# Patient Record
Sex: MALE | Race: WHITE | Employment: FULL TIME | URBAN - METROPOLITAN AREA
[De-identification: names, ages, dates, MRNs, and addresses within clinical notes are randomized per-mention and may not be internally consistent; named-entity substitution may affect disease eponyms.]

---

## 2024-06-17 ENCOUNTER — OFFICE VISIT (OUTPATIENT)
Dept: ORTHOPEDIC SURGERY | Age: 54
End: 2024-06-17
Payer: COMMERCIAL

## 2024-06-17 VITALS — BODY MASS INDEX: 28.63 KG/M2 | WEIGHT: 200 LBS | HEIGHT: 70 IN | RESPIRATION RATE: 16 BRPM

## 2024-06-17 DIAGNOSIS — S52.502A CLOSED FRACTURE OF DISTAL END OF LEFT RADIUS, UNSPECIFIED FRACTURE MORPHOLOGY, INITIAL ENCOUNTER: Primary | ICD-10-CM

## 2024-06-17 PROCEDURE — G8419 CALC BMI OUT NRM PARAM NOF/U: HCPCS

## 2024-06-17 PROCEDURE — 36415 COLL VENOUS BLD VENIPUNCTURE: CPT

## 2024-06-17 PROCEDURE — G8428 CUR MEDS NOT DOCUMENT: HCPCS

## 2024-06-17 PROCEDURE — 99203 OFFICE O/P NEW LOW 30 MIN: CPT

## 2024-06-17 PROCEDURE — 3017F COLORECTAL CA SCREEN DOC REV: CPT

## 2024-06-17 PROCEDURE — 4004F PT TOBACCO SCREEN RCVD TLK: CPT

## 2024-06-17 NOTE — PROGRESS NOTES
ORTHOPEDIC NEW PATIENT EVALUATION      HPI / Chief Complaint  Justin Kyle is a 53 y.o. male who presents for evaluation of a left wrist fracture.  Patient states he was working on his boat last Thursday and fell but is unsure how exactly he landed on his wrist.  States he noted obvious deformity and instant swelling over the dorsal aspect of his wrist.  States that he has not taken anything for the pain at this point other than some aspirin.  He was placed in a sugar-tong splint to the left upper extremity at his visit to the ED on that same day as the incident.  Patient states that he is having some pain at the wrist although it is much improved at this point.  He does state some pain in the elbow due to the splint and states it has been difficult for him to sleep as it is difficult to find a comfortable position.  Denies any previous surgeries.  Denies any blood thinners.  States he has no numbness or tingling into the hand or fingers.    Past Medical History  Justin  has no past medical history on file.    Past Surgical History  Justin  has no past surgical history on file.    Current Medications  No current outpatient medications on file.     No current facility-administered medications for this visit.       Allergies  Allergies have been reviewed.  Justin has No Known Allergies.    Social History  Justin      Family History  Justin's family history is not on file.      Review of Systems   History obtained from the patient.   REVIEW OF SYSTEMS:   Constitution: negative for fever, chills, weight loss or malaise   Musculoskeletal: As noted in the HPI   Neurologic: As noted in the HPI    Physical Exam  Resp 16   Ht 1.778 m (5' 10\")   Wt 90.7 kg (200 lb)   BMI 28.70 kg/m²    General Appearance: alert, well appearing, and in no distress  Mental Status: alert, oriented to person, place, and time  Evaluation of the patient's left wrist and upper extremity demonstrate the patient to be in a well-fitted

## 2024-06-18 ENCOUNTER — HOSPITAL ENCOUNTER (OUTPATIENT)
Dept: PREADMISSION TESTING | Age: 54
Setting detail: OUTPATIENT SURGERY
Discharge: HOME | End: 2024-06-22
Payer: COMMERCIAL

## 2024-06-18 ENCOUNTER — TELEPHONE (OUTPATIENT)
Dept: ORTHOPEDIC SURGERY | Age: 54
End: 2024-06-18

## 2024-06-18 VITALS
OXYGEN SATURATION: 100 % | SYSTOLIC BLOOD PRESSURE: 164 MMHG | RESPIRATION RATE: 16 BRPM | TEMPERATURE: 98.2 F | BODY MASS INDEX: 28.63 KG/M2 | DIASTOLIC BLOOD PRESSURE: 100 MMHG | WEIGHT: 200 LBS | HEART RATE: 71 BPM | HEIGHT: 70 IN

## 2024-06-18 DIAGNOSIS — S52.502A CLOSED FRACTURE OF DISTAL END OF LEFT RADIUS, UNSPECIFIED FRACTURE MORPHOLOGY, INITIAL ENCOUNTER: ICD-10-CM

## 2024-06-18 LAB
ANION GAP SERPL CALCULATED.3IONS-SCNC: 9 MMOL/L (ref 9–17)
BUN SERPL-MCNC: 17 MG/DL (ref 6–20)
CALCIUM SERPL-MCNC: 9.3 MG/DL (ref 8.6–10.4)
CHLORIDE SERPL-SCNC: 104 MMOL/L (ref 98–107)
CO2 SERPL-SCNC: 28 MMOL/L (ref 20–31)
CREAT SERPL-MCNC: 1.1 MG/DL (ref 0.7–1.2)
ERYTHROCYTE [DISTWIDTH] IN BLOOD BY AUTOMATED COUNT: 13.2 % (ref 11.5–14.9)
GFR, ESTIMATED: 80 ML/MIN/1.73M2
GLUCOSE SERPL-MCNC: 107 MG/DL (ref 70–99)
HCT VFR BLD AUTO: 44.1 % (ref 41–53)
HGB BLD-MCNC: 15.3 G/DL (ref 13.5–17.5)
MCH RBC QN AUTO: 31.2 PG (ref 26–34)
MCHC RBC AUTO-ENTMCNC: 34.6 G/DL (ref 31–37)
MCV RBC AUTO: 90 FL (ref 80–100)
PLATELET # BLD AUTO: 190 K/UL (ref 150–450)
PMV BLD AUTO: 7.8 FL (ref 6–12)
POTASSIUM SERPL-SCNC: 4.7 MMOL/L (ref 3.7–5.3)
RBC # BLD AUTO: 4.9 M/UL (ref 4.5–5.9)
SODIUM SERPL-SCNC: 141 MMOL/L (ref 135–144)
WBC OTHER # BLD: 5.8 K/UL (ref 3.5–11)

## 2024-06-18 PROCEDURE — 85027 COMPLETE CBC AUTOMATED: CPT

## 2024-06-18 PROCEDURE — APPSS45 APP SPLIT SHARED TIME 31-45 MINUTES: Performed by: NURSE PRACTITIONER

## 2024-06-18 PROCEDURE — 93005 ELECTROCARDIOGRAM TRACING: CPT | Performed by: ANESTHESIOLOGY

## 2024-06-18 PROCEDURE — 36415 COLL VENOUS BLD VENIPUNCTURE: CPT

## 2024-06-18 PROCEDURE — 80048 BASIC METABOLIC PNL TOTAL CA: CPT

## 2024-06-18 RX ORDER — LISINOPRIL 10 MG/1
10 TABLET ORAL DAILY
COMMUNITY
Start: 2023-09-25 | End: 2024-06-18

## 2024-06-18 ASSESSMENT — ENCOUNTER SYMPTOMS
GASTROINTESTINAL NEGATIVE: 1
SORE THROAT: 0
BACK PAIN: 1
COUGH: 0
TROUBLE SWALLOWING: 0
SHORTNESS OF BREATH: 0
ABDOMINAL PAIN: 0

## 2024-06-18 ASSESSMENT — PAIN DESCRIPTION - LOCATION: LOCATION: ARM

## 2024-06-18 ASSESSMENT — PAIN DESCRIPTION - PAIN TYPE: TYPE: ACUTE PAIN

## 2024-06-18 ASSESSMENT — PAIN SCALES - GENERAL: PAINLEVEL_OUTOF10: 4

## 2024-06-18 ASSESSMENT — PAIN DESCRIPTION - DESCRIPTORS: DESCRIPTORS: ACHING

## 2024-06-18 ASSESSMENT — PAIN DESCRIPTION - ORIENTATION: ORIENTATION: LEFT

## 2024-06-18 NOTE — TELEPHONE ENCOUNTER
Attempted to call preferred phone number but no answer and voicemail full.  Called secondary number and was able to speak with the patient.  ASA is requesting medical clearance for his surgery 6/20 due to his high blood pressure.    We referred him to Dr Sadler's office but they were unable to give him an appointment in time.  I gave the patient the phone number for the [facility] and asked him to call to try and make an appointment.    I informed him that if he is unable to get medical clearance ASA will not allow us to proceed with surgery.  He was going to try and call then let us know.

## 2024-06-18 NOTE — DISCHARGE INSTRUCTIONS
prepared for surgery.  A physical assessment will be performed by a nurse practitioner or house officer.  Your IV will be started and you will meet your anesthesiologist.    When you go to surgery, your family will be directed to the surgical waiting room, where the doctor should speak with them after your surgery.    After surgery, you will be taken to the recovery area.  When you are alert and stable, you will receive instructions and be prepared for discharge.     If you use a Bi-PAP or C-PAP machine, please bring it with you and leave it in the car in case it is needed in recovery room.

## 2024-06-18 NOTE — H&P
HISTORY and PHYSICAL  [facility]       NAME:  Justin Kyle  MRN: [id number]   YOB: 1970   Date: 6/18/2024   Age: 53 y.o.  Gender: male     COMPLAINT AND PRESENT HISTORY:   Justin Kyle is 53 y.o.,  male, presents for pre-anesthesia/admission testing for WRIST OPEN REDUCTION INTERNAL FIXATION LEFT per Dr. Yañez.  Primary dx: Closed fracture of distal end of left radius, unspecified fracture morphology, initial encounter [S52.502A].    Office note per Rosendo Sierra PA-C on 6/17/2024  HPI / Chief Complaint  Justin Kyle is a 53 y.o. male who presents for evaluation of a left wrist fracture.  Patient states he was working on his boat last Thursday and fell but is unsure how exactly he landed on his wrist.  States he noted obvious deformity and instant swelling over the dorsal aspect of his wrist.  States that he has not taken anything for the pain at this point other than some aspirin.  He was placed in a sugar-tong splint to the left upper extremity at his visit to the ED on that same day as the incident.  Patient states that he is having some pain at the wrist although it is much improved at this point.  He does state some pain in the elbow due to the splint and states it has been difficult for him to sleep as it is difficult to find a comfortable position.  Denies any previous surgeries.  Denies any blood thinners.  States he has no numbness or tingling into the hand or fingers.  Imaging Studies  3 view xrays of the left wrist obtained on 6/13/2024 were independently reviewed demonstrating the patient to have an intra-articular, displaced, dorsally angulated distal radius fracture.      UPDATE 6/18/2024:  Patient complaints of left wrist pain.  The pain began 6/13/2024 s/p fall with ER visit with closed fracture of left wrist. The pain is located left wrist.  He describes the symptoms as sharp. Symptoms improve with rest. The symptoms are worse with movement and use of injured

## 2024-06-19 ENCOUNTER — TELEPHONE (OUTPATIENT)
Dept: ORTHOPEDIC SURGERY | Age: 54
End: 2024-06-19

## 2024-06-19 RX ORDER — LIDOCAINE HYDROCHLORIDE 10 MG/ML
1 INJECTION, SOLUTION EPIDURAL; INFILTRATION; INTRACAUDAL; PERINEURAL
Status: CANCELLED | OUTPATIENT
Start: 2024-06-20 | End: 2024-06-21

## 2024-06-19 RX ORDER — GABAPENTIN 300 MG/1
300 CAPSULE ORAL ONCE
Status: CANCELLED | OUTPATIENT
Start: 2024-06-20 | End: 2024-06-19

## 2024-06-19 RX ORDER — SODIUM CHLORIDE 0.9 % (FLUSH) 0.9 %
5-40 SYRINGE (ML) INJECTION PRN
Status: CANCELLED | OUTPATIENT
Start: 2024-06-20

## 2024-06-19 RX ORDER — DEXAMETHASONE SODIUM PHOSPHATE 4 MG/ML
4 INJECTION, SOLUTION INTRA-ARTICULAR; INTRALESIONAL; INTRAMUSCULAR; INTRAVENOUS; SOFT TISSUE ONCE
Status: CANCELLED | OUTPATIENT
Start: 2024-06-20

## 2024-06-19 RX ORDER — MIDAZOLAM HYDROCHLORIDE 1 MG/ML
2 INJECTION INTRAMUSCULAR; INTRAVENOUS
Status: CANCELLED | OUTPATIENT
Start: 2024-06-20 | End: 2024-06-21

## 2024-06-19 RX ORDER — ROPIVACAINE HYDROCHLORIDE 5 MG/ML
20 INJECTION, SOLUTION EPIDURAL; INFILTRATION; PERINEURAL ONCE
Status: CANCELLED | OUTPATIENT
Start: 2024-06-20 | End: 2024-06-19

## 2024-06-19 RX ORDER — SODIUM CHLORIDE 0.9 % (FLUSH) 0.9 %
5-40 SYRINGE (ML) INJECTION EVERY 12 HOURS SCHEDULED
Status: CANCELLED | OUTPATIENT
Start: 2024-06-20

## 2024-06-19 RX ORDER — ACETAMINOPHEN 500 MG
1000 TABLET ORAL ONCE
Status: CANCELLED | OUTPATIENT
Start: 2024-06-20 | End: 2024-06-19

## 2024-06-19 RX ORDER — SODIUM CHLORIDE 9 MG/ML
INJECTION, SOLUTION INTRAVENOUS PRN
Status: CANCELLED | OUTPATIENT
Start: 2024-06-20

## 2024-06-19 RX ORDER — SODIUM CHLORIDE, SODIUM LACTATE, POTASSIUM CHLORIDE, CALCIUM CHLORIDE 600; 310; 30; 20 MG/100ML; MG/100ML; MG/100ML; MG/100ML
INJECTION, SOLUTION INTRAVENOUS CONTINUOUS
Status: CANCELLED | OUTPATIENT
Start: 2024-06-20

## 2024-06-19 NOTE — TELEPHONE ENCOUNTER
Patient called to let me know that he had been unable to get in with a PCP for surgery.  I spoke with Dr Yañez to find out how he wishes to proceed at this point.  Dr Yañez stated we could switch to a closed reduction under local ASA instead if the patient wishes to proceed that way.  If not he could attempt to get in with a PCP and see one of the trauma orthos next week for possible surgery.    I called patient's phone and did not get an answer, voicemail was full.  I called patient's son's number and asked him to have the patient call me back.

## 2024-06-19 NOTE — PRE-PROCEDURE INSTRUCTIONS
Nothing to eat after midnight.   Are you taking any blood thinners? When was the last day?  Make sure to use Hibiclens prior to surgery.  Remove any jewelry and body piercings.  Do you wear glasses? If so, please bring a case to store them in.  Are you having any Covid symptoms?  Do you have any new rashes, infections, etc. that we should be aware of?  Do you have a ride home the day of surgery? It cannot be a cab or medical transportation.  Verify surgery time and what time to arrive at hospital.  UNABLE TO LEAVE VM

## 2024-06-20 ENCOUNTER — HOSPITAL ENCOUNTER (OUTPATIENT)
Age: 54
Setting detail: OUTPATIENT SURGERY
Discharge: HOME | End: 2024-06-20
Attending: ORTHOPAEDIC SURGERY | Admitting: ORTHOPAEDIC SURGERY
Payer: COMMERCIAL

## 2024-06-20 VITALS
HEIGHT: 70 IN | WEIGHT: 200 LBS | OXYGEN SATURATION: 100 % | HEART RATE: 74 BPM | TEMPERATURE: 97.4 F | SYSTOLIC BLOOD PRESSURE: 168 MMHG | RESPIRATION RATE: 16 BRPM | BODY MASS INDEX: 28.63 KG/M2 | DIASTOLIC BLOOD PRESSURE: 100 MMHG

## 2024-06-20 PROCEDURE — 2709999900 HC NON-CHARGEABLE SUPPLY: Performed by: ORTHOPAEDIC SURGERY

## 2024-06-20 PROCEDURE — 3600000012 HC SURGERY LEVEL 2 ADDTL 15MIN: Performed by: ORTHOPAEDIC SURGERY

## 2024-06-20 PROCEDURE — 7100000010 HC PHASE II RECOVERY - FIRST 15 MIN: Performed by: ORTHOPAEDIC SURGERY

## 2024-06-20 PROCEDURE — 25605 CLTX DST RDL FX/EPHYS SEP W/: CPT | Performed by: ORTHOPAEDIC SURGERY

## 2024-06-20 PROCEDURE — 6360000002 HC RX W HCPCS: Performed by: ORTHOPAEDIC SURGERY

## 2024-06-20 PROCEDURE — 7100000011 HC PHASE II RECOVERY - ADDTL 15 MIN: Performed by: ORTHOPAEDIC SURGERY

## 2024-06-20 PROCEDURE — 3600000002 HC SURGERY LEVEL 2 BASE: Performed by: ORTHOPAEDIC SURGERY

## 2024-06-20 RX ORDER — ACETAMINOPHEN 325 MG/1
1000 TABLET ORAL ONCE
Status: CANCELLED | OUTPATIENT
Start: 2024-06-20 | End: 2024-06-20

## 2024-06-20 RX ORDER — SODIUM CHLORIDE 9 MG/ML
INJECTION, SOLUTION INTRAVENOUS PRN
Status: CANCELLED | OUTPATIENT
Start: 2024-06-20

## 2024-06-20 RX ORDER — SODIUM CHLORIDE 0.9 % (FLUSH) 0.9 %
5-40 SYRINGE (ML) INJECTION PRN
Status: CANCELLED | OUTPATIENT
Start: 2024-06-20

## 2024-06-20 RX ORDER — BUPIVACAINE HYDROCHLORIDE 5 MG/ML
INJECTION, SOLUTION EPIDURAL; INTRACAUDAL PRN
Status: DISCONTINUED | OUTPATIENT
Start: 2024-06-20 | End: 2024-06-20 | Stop reason: ALTCHOICE

## 2024-06-20 RX ORDER — DEXAMETHASONE SODIUM PHOSPHATE 10 MG/ML
10 INJECTION, SOLUTION INTRAMUSCULAR; INTRAVENOUS ONCE
Status: CANCELLED | OUTPATIENT
Start: 2024-06-20 | End: 2024-06-20

## 2024-06-20 RX ORDER — SODIUM CHLORIDE 0.9 % (FLUSH) 0.9 %
5-40 SYRINGE (ML) INJECTION EVERY 12 HOURS SCHEDULED
Status: CANCELLED | OUTPATIENT
Start: 2024-06-20

## 2024-06-20 ASSESSMENT — PAIN - FUNCTIONAL ASSESSMENT
PAIN_FUNCTIONAL_ASSESSMENT: NONE - DENIES PAIN
PAIN_FUNCTIONAL_ASSESSMENT: NONE - DENIES PAIN

## 2024-06-20 NOTE — H&P
Update History & Physical    The patient's History and Physical of June 18, 2024 was reviewed with the patient and I examined the patient. There was no change. The surgical site was confirmed by the patient and me. PT UNDERGOING FOR WRIST OPEN REDUCTION INTERNAL FIXATION LEFT per Dr. Yañez.   Pt denies fever/chills, chest pain or SOB  Pt  Npo since the past midnight, no am medication today  H/o of MRSA infection left knee 10-15 years ago  Denies hx of blood clots.  Anticoagulation: None  Denies hx of any personal or family hx of complications w/anesthesia.   Pt state he has no  medical clearance   He stopped taking his BP for almost one year due to no refill of his BP medication.  Last cardiology office visit  9/25/2023.  Physical exam reman unchanged including cardiac and pulmonary assessment   See nursing flow sheet for vital sings     Lab Results   Component Value Date    WBC 5.8 06/18/2024    HGB 15.3 06/18/2024    HCT 44.1 06/18/2024    MCV 90.0 06/18/2024     06/18/2024     Lab Results   Component Value Date/Time     06/18/2024 11:02 AM    K 4.7 06/18/2024 11:02 AM     06/18/2024 11:02 AM    CO2 28 06/18/2024 11:02 AM    BUN 17 06/18/2024 11:02 AM    CREATININE 1.1 06/18/2024 11:02 AM    GLUCOSE 107 06/18/2024 11:02 AM    CALCIUM 9.3 06/18/2024 11:02 AM    LABGLOM 80 06/18/2024 11:02 AM      BP Readings from Last 3 Encounters:   06/18/24 (!) 164/100     Pulse Readings from Last 3 Encounters:   06/18/24 71     ECG 6/18/2024  Sinus rhythm with marked sinus arrhythmia  Minimal voltage criteria for LVH, may be normal variant ( Sokolow-Santos )  Borderline ECG  No previous ECGs available      Electronically signed by KENTON Lauren CNP on 6/20/2024 at 9:19 AM            HISTORY and PHYSICAL  [facility]         NAME:  Justin Kyle  MRN: [id number]   YOB: 1970   Date: 6/18/2024   Age: 53 y.o.  Gender: male      COMPLAINT AND PRESENT HISTORY:   Justin Kyle is 53  normal variant  Borderline ECG  SURGERY / PROVISIONAL DIAGNOSES:       WRIST OPEN REDUCTION INTERNAL FIXATION LEFT     Closed fracture of distal end of left radius, unspecified fracture morphology, initial encounter [S54.383M]     There are no problems to display for this patient.           CLEARANCE: Dr. Gonsalez, anesthesia, was contacted and informed of patient's history and planned surgery.  Medical clearance required for scheduled surgery d/t the following issues which are discussed in H&P above: uncontrolled HTN     Dr. Yañez's office will be responsible for making sure the clearance is obtained and is in the chart for surgery.      Total time spent on encounter- PAT provider minutes: 21-30 minutes      KENTON Akers CNP on 6/18/2024 at 10:51 AM

## 2024-06-20 NOTE — BRIEF OP NOTE
Brief Postoperative Note      Patient: Justin Kyle  YOB: 1970  MRN: [id number]    Date of Procedure: 6/20/2024    Pre-Op Diagnosis Codes:     * Closed fracture of distal end of left radius, unspecified fracture morphology, initial encounter [S52.502A]    Post-Op Diagnosis: Same       Procedure(s):  WRIST CLOSED REDUCTION    Surgeon(s):  Tanja Yañez MD    Assistant:  Physician Assistant: Rosendo Sierra PA-C    Anesthesia: Local    Estimated Blood Loss (mL): None    Complications: None    Specimens:   * No specimens in log *    Implants:  * No implants in log *      Drains: * No LDAs found *    Findings:  Infection Present At Time Of Surgery (PATOS) (choose all levels that have infection present):  No infection present  Other Findings: See operative report    Electronically signed by Tanja Yañez MD on 6/20/2024 at 1:35 PM

## 2024-06-20 NOTE — DISCHARGE INSTRUCTIONS
Tanja Yañez MD  [facility]  Specializing in Shoulder and Elbow Surgery      POSTPROCEDURE INSTRUCTIONS    Procedure: Closed Reduction and Immobilization of Left Distal Radius Fracture    DIET  Begin with clear liquids and light foods (jellos, soups, etc.).  Progress to your normal diet if you are not nauseated.    WOUND CARE  Maintain your splint, may loosen bandage if swelling occurs.  Your splint will be changed at your 2 week follow up clinic visit.   Keep your splint on, clean, and dry at all times - you may shower by  placing a large garbage bag over the extremity - do not immerse your arm (i.e. in a bath, pool).    MEDICATIONS  Most patients will require some narcotic pain medication for a short period of time - start it before numbing medicine wears off if used, and use as directed on the bottle.  Common side effects of the pain medication are nausea, drowsiness, and constipation - to decrease the side effects, take medication with food - if constipation occurs, consider taking an over-the-counter laxative.  If you are having problems with nausea and vomiting, take your anti-emetic if prescribed, otherwise, contact the office to possibly have your medication changed ([phone]).  Do not drive or operate machinery while taking the narcotic medication.  Tylenol may be taken to help with pain control. Do not take this if you are taking your prescription pain medication, as this already contains tylenol.  Please resume all home medications, unless instructed otherwise.    ACTIVITY  Keep the limb elevated for several days following surgery to decrease swelling.  Do not engage in activities which increase pain/swelling such as lifting, pushing or pulling for at least 6 weeks following surgery.  NO driving while taking narcotic medications or until instructed otherwise by physician.  Do not weight-bear through the left arm;  you

## 2024-06-21 LAB
EKG ATRIAL RATE: 64 BPM
EKG P AXIS: 47 DEGREES
EKG P-R INTERVAL: 148 MS
EKG Q-T INTERVAL: 404 MS
EKG QRS DURATION: 90 MS
EKG QTC CALCULATION (BAZETT): 416 MS
EKG R AXIS: 34 DEGREES
EKG T AXIS: 8 DEGREES
EKG VENTRICULAR RATE: 64 BPM

## 2024-06-21 NOTE — OP NOTE
Procedure Report     Date of Procedure: 6/20/2024      Pre-procedure Diagnosis: Closed left intra-articular distal radius fracture     Post-procedure Diagnosis: Closed left intra-articular distal radius fracture      Procedure: Closed reduction and immobilization of left distal radius fracture (25570)     Surgeon(s): Tanja Yañez MD     Anesthesia: No resident present. Rosendo Sierra PA-C, medically necessary for patient positioning, maintenance of reduction as well as splinting.     Estimated blood loss: None     Findings: Displaced intra-articular distal radius fracture with an impacted ulnar fracture fragment     Procedure Indications: Mr. Kyle is a 53 y.o. old male who presented with a closed left intra-articular distal radius fracture. Following a discussion with the patient regarding both non-operative and operative treatment options, he consented to proceed with closed reduction and immobilization of his left distal radius fracture. he came to this decision after demonstrating an understanding of our discussion regarding details of the procedure, risks and benefits, expected outcome, and postoperative course.     Procedure technique:      Following appropriate identification of the patient and his affected extremity, consent was reviewed with the patient and his affected extremity was signed. The patient's wrist was prepped using chlorhexidine. A time out was performed during which the correct patient, operative extremity, and procedure were verified. The dorsum of the patient's left wrist was prepped using chlorhexidine and a hematoma block was then administered using 8 cc of 0.5% Marcaine without epinephrine. I then proceeded to gently manipulate the patient's left distal radius under fluoroscopic guidance reducing the distal radius to acceptable alignment.  The impacted ulnar fragment showed some improvement in alignment but remained impacted.  A well-padded sugar tong splint was then applied to the

## 2024-06-28 PROBLEM — S52.572A OTHER INTRAARTICULAR FRACTURE OF LOWER END OF LEFT RADIUS, INITIAL ENCOUNTER FOR CLOSED FRACTURE: Status: ACTIVE | Noted: 2024-06-28

## 2024-07-08 ENCOUNTER — OFFICE VISIT (OUTPATIENT)
Dept: ORTHOPEDIC SURGERY | Age: 54
End: 2024-07-08

## 2024-07-08 VITALS — WEIGHT: 199.96 LBS | BODY MASS INDEX: 28.63 KG/M2 | RESPIRATION RATE: 16 BRPM | HEIGHT: 70 IN

## 2024-07-08 DIAGNOSIS — S52.502A CLOSED FRACTURE OF DISTAL END OF LEFT RADIUS, UNSPECIFIED FRACTURE MORPHOLOGY, INITIAL ENCOUNTER: Primary | ICD-10-CM

## 2024-07-08 PROCEDURE — 99024 POSTOP FOLLOW-UP VISIT: CPT | Performed by: ORTHOPAEDIC SURGERY

## 2024-07-08 NOTE — PROGRESS NOTES
Procedure: Closed reduction left distal radius fracture  Date of procedure: 6/20/2024    HPI: Mr. Kyle is a 53-year-old gentleman just over 2 weeks status post the aforementioned procedure.  Indicates that he is doing fairly well.  Pain has been appropriately controlled.  He denies having any numbness or tingling.    Physical examination:  Evaluation of the patient's left wrist and upper extremity demonstrates intact skin with mild diffuse swelling.  Sensation is grossly intact light touch in all dermatomes and he has a 2+ radial pulse with brisk cap refill in his fingers.    Imaging studies:  3 x-ray views of the left wrist completed on 7/8/2024 reviewed independently demonstrating a distal radius intra-articular fracture with impaction the ulnar segment.  No obvious dislocation or subluxation.     Impression and plan: Mr. Kyle is a 53-year-old gentleman approximately 2 weeks status post closed reduction left distal radius fracture.  He is doing relatively well.  He has a persistent impacted ulnar segment of this fracture.  That was noted to be the case during the manipulation procedure.  I did explain to the patient at that time that unfortunately I would not be able to address this by closed means.  Due to blood pressure problems it was recommended that he be cleared medically and have this addressed as part of that prior to surgery but he declined going this route instead elected to proceed with close reduction.  In any event today his splint was discontinued and he was placed in a well-padded fiberglass short arm cast.  He is instructed to avoid getting this wet.  I would like to see him back for reevaluation in 4 weeks but he may return or call at anytime with any questions and or concerns.

## 2024-07-29 ENCOUNTER — OFFICE VISIT (OUTPATIENT)
Dept: ORTHOPEDIC SURGERY | Age: 54
End: 2024-07-29
Payer: COMMERCIAL

## 2024-07-29 VITALS — HEIGHT: 70 IN | WEIGHT: 199 LBS | BODY MASS INDEX: 28.49 KG/M2 | RESPIRATION RATE: 14 BRPM

## 2024-07-29 DIAGNOSIS — S52.502A CLOSED FRACTURE OF DISTAL END OF LEFT RADIUS, UNSPECIFIED FRACTURE MORPHOLOGY, INITIAL ENCOUNTER: Primary | ICD-10-CM

## 2024-07-29 PROCEDURE — 99024 POSTOP FOLLOW-UP VISIT: CPT

## 2024-07-29 PROCEDURE — 3017F COLORECTAL CA SCREEN DOC REV: CPT

## 2024-07-29 PROCEDURE — 1036F TOBACCO NON-USER: CPT

## 2024-07-29 PROCEDURE — G8419 CALC BMI OUT NRM PARAM NOF/U: HCPCS

## 2024-07-29 PROCEDURE — G8428 CUR MEDS NOT DOCUMENT: HCPCS

## 2024-07-29 NOTE — PROGRESS NOTES
Procedure: Closed reduction left distal radius fracture  Date of procedure: 6/20/2024    HPI: Mr. Kyle is a 53-year-old gentleman just over 6 weeks status post the aforementioned procedure.  Indicates that he is doing fairly well.  States he has been using the arm even though he knows he should not be when he was wearing the cast.  He states he has been doing his normal activities and pain has been appropriately controlled.  States that his symptoms have not changed but he does still have pain over the dorsal aspect of the wrist and states that it is painful when trying to move it.  He continues to deny having any numbness or tingling.  He has been compliant with the cast for the last 3 weeks.    Physical examination:  Evaluation of the patient's left wrist and upper extremity demonstrates intact skin with mild diffuse swelling present.  Sensation is grossly intact light touch in all dermatomes and he has a 2+ radial pulse with brisk cap refill in his fingers.  Able to make a fist.  Able to flex, extend, adduct, abduct all fingers.    Imaging studies:  3 x-ray views of the left wrist completed on 7/8/2024 reviewed independently demonstrating a distal radius intra-articular fracture with impaction the ulnar segment.  No significant dorsal angulation.  No significant change in alignment from previous radiographs.  Interval healing is noted throughout the level of the fracture.  This can no obvious dislocation or subluxation.     Impression and plan: Mr. Kyle is a 53-year-old gentleman approximately 6 weeks status post closed reduction left distal radius fracture.  He is doing relatively well.  He has a persistent impacted ulnar segment of this fracture.  I did discuss with him that it does appear that the alignment of the fracture has not changed significantly from previous radiographs he does have some impaction still present but it does appear the fracture is starting to heal.  We did again discuss the etiologies

## 2024-08-12 ENCOUNTER — HOSPITAL ENCOUNTER (OUTPATIENT)
Facility: CLINIC | Age: 54
Setting detail: THERAPIES SERIES
Discharge: HOME | End: 2024-08-12
Payer: COMMERCIAL

## 2024-08-12 PROCEDURE — 97165 OT EVAL LOW COMPLEX 30 MIN: CPT

## 2024-08-12 PROCEDURE — 97110 THERAPEUTIC EXERCISES: CPT

## 2024-08-12 PROCEDURE — 97140 MANUAL THERAPY 1/> REGIONS: CPT

## 2024-08-12 NOTE — THERAPY EVALUATION
[] [facility]  Outpatient Rehabilitation &  Therapy  [address]  [phone]  F: [phone] [x] [facility]  Outpatient Rehabilitation &  Therapy  [address]   [address]  P: [phone]  F: [phone] [] [facility]  Outpatient Rehabilitation &  Therapy  [address]  P: [phone]  F: [phone] [] [facility]  Outpatient Rehabilitation &  Therapy  [address]   P: [phone]  F: [phone] [] [facility]   Outpatient Rehabilitation   & Therapy  [address]  P: [phone]   F: [phone]       Occupational Therapy Hand & Upper Extremity  Initial Evaluation    Date: 2024      Patient: Justin Kyle  : 1970  MRN: [id number]  Referring Provider:  Rosendo Sierra PA-C   Insurance: [employer] [[id number]]   Medical Diagnosis: Closed fracture of distal end of left radius, unspecified fracture morphology, Code: (S52.502A)     Rehab Codes: pain in wrist M25.53,, stiffness in wrist M25.63,, muscle weakness generalized M62.81,, or edema localized R60.0,  Onset Date: 24 Surgery Date: 24 (Closed reduction)  Next Dr. Appt: 24     Insurance/Authorization: [employer] [[id number]], No AUTH required  10 visit limit HARD MAX    Subjective:   Current Complaints: Pt. Reports that he fell off of a boat in  when he was working on it inside of a garage. He reports that he experiences constant 5/10 pain at his L wrist and that he has not tried any pain relief methods at home. Pt. Experiences difficulty opening bags and completing daily activities that require the use of mayte. UEs.      Mechanism of Injury: FOOSH  Surgery Date: 24 (Closed reduction)   Precautions: Gentle ROM L wrist; avoid heavy lifting with left upper extremity    Involved Extremity: Left     Dominant Extremity: Right    Orthosis: Currently has. Pt. Reports doing \"worse\" with the brace on, as compared

## 2024-08-20 ENCOUNTER — HOSPITAL ENCOUNTER (OUTPATIENT)
Facility: CLINIC | Age: 54
Setting detail: THERAPIES SERIES
Discharge: HOME | End: 2024-08-20
Payer: COMMERCIAL

## 2024-08-20 PROCEDURE — 97110 THERAPEUTIC EXERCISES: CPT

## 2024-08-20 PROCEDURE — 97140 MANUAL THERAPY 1/> REGIONS: CPT

## 2024-08-20 NOTE — FLOWSHEET NOTE
[] [facility]  Outpatient Rehabilitation &  Therapy  [address]  [phone]  F: [phone] [x] [facility]  Outpatient Rehabilitation &  Therapy  [address]   [address]  P: [phone]  F: [phone] [] [facility]  Outpatient Rehabilitation &  Therapy  [address]  P: [phone]  F: [phone] [] [facility]  Outpatient Rehabilitation &  Therapy  [address]   P: [phone]  F: [phone] [] [facility]   Outpatient Rehabilitation   & Therapy  [address]  P: [phone]   F: [phone]     Occupational Therapy Daily Treatment Note    Date:  2024  Patient Name:  Justin Kyle    :  1970  MRN: [id number]  Referring Provider:  Rosendo Sierra PA-C   Insurance: [employer] [[id number]]   Medical Diagnosis: Closed fracture of distal end of left radius, unspecified fracture morphology, Code: (S52.502A)           Rehab Codes: pain in wrist M25.53,, stiffness in wrist M25.63,, muscle weakness generalized M62.81,, or edema localized R60.0,  Onset Date: 24 Surgery Date: 24 (Closed reduction)  Next Dr. Appt: 24     Visit# / total visits: 10; Progress note for Medicare patient due at visit 5    Cancels/No Shows: 0/1      Subjective:    Pain:  [x] Yes  [] No Location: wrist  Pain Rating: (0-10 scale) 3/10  Pain altered Tx:  [x] No  [] Yes  Action:  Pt Comments:  Wearing the splint even at night bothers me.     Precautions:  Gentle ROM L wrist; avoid heavy lifting with left upper extremity   Surgery procedure and date: n/a  L wrist s/p closed reduction on 24.     Objective:  Today's Treatment:  Modalities:  Exercises:  Exercise Flow Sheet:  Exercise Reps/Time Weight/Level Comments Completed   Manual Therapy/  ROM 8 min   STM to volar/dorsal L FA x   HEP  AROM ~8 min total between HEP/AROM      Prayer     Flex active and over pressor

## 2024-08-23 ENCOUNTER — HOSPITAL ENCOUNTER (OUTPATIENT)
Facility: CLINIC | Age: 54
Setting detail: THERAPIES SERIES
Discharge: HOME | End: 2024-08-23
Payer: COMMERCIAL

## 2024-08-23 PROCEDURE — 97140 MANUAL THERAPY 1/> REGIONS: CPT

## 2024-08-23 PROCEDURE — 97110 THERAPEUTIC EXERCISES: CPT

## 2024-08-23 NOTE — FLOWSHEET NOTE
[] [facility]  Outpatient Rehabilitation &  Therapy  [address]  [phone]  F: [phone] [x] [facility]  Outpatient Rehabilitation &  Therapy  [address]   [address]  P: [phone]  F: [phone] [] [facility]  Outpatient Rehabilitation &  Therapy  [address]  P: [phone]  F: [phone] [] [facility]  Outpatient Rehabilitation &  Therapy  [address]   P: [phone]  F: [phone] [] [facility]   Outpatient Rehabilitation   & Therapy  [address]  P: [phone]   F: [phone]     Occupational Therapy Daily Treatment Note    Date:  2024  Patient Name:  Justin Kyle    :  1970  MRN: [id number]  Referring Provider:  Rosendo Sierra PA-C   Insurance: [employer] [[id number]]   Medical Diagnosis: Closed fracture of distal end of left radius, unspecified fracture morphology, Code: (S52.502A)           Rehab Codes: pain in wrist M25.53,, stiffness in wrist M25.63,, muscle weakness generalized M62.81,, or edema localized R60.0,  Onset Date: 24 Surgery Date: 24 (Closed reduction)  Next  Appt: 24     Visit# / total visits: 3/10; Progress note for Medicare patient due at visit 5    Cancels/No Shows: 0/1      Subjective:    Pain:  [x] Yes  [] No Location: L wrist  Pain Rating: (0-10 scale) 3-4/10  Pain altered Tx:  [x] No  [] Yes  Action: N/A    Pt Comments:  Pt. Reports that he has not been wearing his splint at all anymore due to the pain he experiences at the L wrist after wearing it. Pt. Reports that he knows what he can and can't do at work in regard to use of his L wrist. Pt. Reports that he has not had time recently to complete his HEP, however he explained that he recognizes the importance of it and will try to start completing the exercises as part of his nightly routine.     Precautions:  Gentle ROM L wrist; avoid heavy lifting with left

## 2024-08-26 ENCOUNTER — HOSPITAL ENCOUNTER (OUTPATIENT)
Facility: CLINIC | Age: 54
Setting detail: THERAPIES SERIES
Discharge: HOME | End: 2024-08-26
Payer: COMMERCIAL

## 2024-08-26 PROCEDURE — 97110 THERAPEUTIC EXERCISES: CPT

## 2024-08-26 PROCEDURE — 97140 MANUAL THERAPY 1/> REGIONS: CPT

## 2024-08-26 NOTE — FLOWSHEET NOTE
[] [facility]  Outpatient Rehabilitation &  Therapy  [address]  [phone]  F: [phone] [x] [facility]  Outpatient Rehabilitation &  Therapy  [address]   [address]  P: [phone]  F: [phone] [] [facility]  Outpatient Rehabilitation &  Therapy  [address]  P: [phone]  F: [phone] [] [facility]  Outpatient Rehabilitation &  Therapy  [address]   P: [phone]  F: [phone] [] [facility]   Outpatient Rehabilitation   & Therapy  [address]  P: [phone]   F: [phone]     Occupational Therapy Daily Treatment Note    Date:  2024  Patient Name:  Justin Kyle    :  1970  MRN: [id number]  Referring Provider:  Rosendo Sierra PA-C   Insurance: [employer] [[id number]]   Medical Diagnosis: Closed fracture of distal end of left radius, unspecified fracture morphology, Code: (S52.502A)           Rehab Codes: pain in wrist M25.53,, stiffness in wrist M25.63,, muscle weakness generalized M62.81,, or edema localized R60.0,  Onset Date: 24 Surgery Date: 24 (Closed reduction)  Next Dr. Appt: 24     Visit# / total visits: 4/10; Progress note for Medicare patient due at visit 5    Cancels/No Shows: 0/1      Subjective:    Pain:  [x] Yes  [] No Location: L wrist  Pain Rating: (0-10 scale) 3/10  Pain altered Tx:  [x] No  [] Yes  Action: N/A    Pt Comments:  Pt. Reports that his L wrist continues to be painful and that he did not have time to complete his HEP over the weekend due to being so busy with work. He reports that picking up objects is most difficult for him.      Precautions:  Gentle ROM L wrist; avoid heavy lifting with left upper extremity   Surgery procedure and date: n/a  L wrist s/p closed reduction on 24.     Objective:      Date of Measurements  24 Rt  Lt             Shoulder Elevation  NT  NT   Elbow ext/flex   WFL  WFL

## 2024-09-05 ENCOUNTER — HOSPITAL ENCOUNTER (OUTPATIENT)
Facility: CLINIC | Age: 54
Setting detail: THERAPIES SERIES
Discharge: HOME | End: 2024-09-05
Payer: COMMERCIAL

## 2024-09-05 PROCEDURE — 97110 THERAPEUTIC EXERCISES: CPT

## 2024-09-05 PROCEDURE — 97140 MANUAL THERAPY 1/> REGIONS: CPT

## 2024-09-05 NOTE — FLOWSHEET NOTE
[] [facility]  Outpatient Rehabilitation &  Therapy  [address]  [phone]  F: [phone] [x] [facility]  Outpatient Rehabilitation &  Therapy  [address]   [address]  P: [phone]  F: [phone] [] [facility]  Outpatient Rehabilitation &  Therapy  [address]  P: [phone]  F: [phone] [] [facility]  Outpatient Rehabilitation &  Therapy  [address]   P: [phone]  F: [phone] [] [facility]   Outpatient Rehabilitation   & Therapy  [address]  P: [phone]   F: [phone]     Occupational Therapy Daily Treatment Note    Date:  2024  Patient Name:  Justin Kyle    :  1970  MRN: [id number]  Referring Provider:  Rosendo Sierra PA-C   Insurance: [employer] [[id number]]   Medical Diagnosis: Closed fracture of distal end of left radius, unspecified fracture morphology, Code: (S52.502A)           Rehab Codes: pain in wrist M25.53,, stiffness in wrist M25.63,, muscle weakness generalized M62.81,, or edema localized R60.0,  Onset Date: 24 Surgery Date: 24 (Closed reduction)  Next  Appt: 24     Visit# / total visits: 6/10; Progress note for Medicare patient due at visit 5    Cancels/No Shows: 0/1      Subjective:    Pain:  [x] Yes  [] No Location: L wrist  Pain Rating: (0-10 scale) 3/10  Pain altered Tx:  [x] No  [] Yes  Action: N/A    Pt Comments:  Pt states he is busy all day and not completing HEP as he is \"too busy\".       Precautions:  Gentle ROM L wrist; avoid heavy lifting with left upper extremity   Surgery procedure and date: n/a  L wrist s/p closed reduction on 24.     Objective:      Date of Measurements  24 Rt  Lt             Shoulder Elevation  NT  NT   Elbow ext/flex   WFL  WFL   FA sup/pro    82/85  55 (post-tx)/83    Wrist ext/flex    63/66  40/38 (post-tx)   Thumb Opp   WFL  WFL   Digit flex from the DPC (cm)  WFL

## 2024-09-09 ENCOUNTER — OFFICE VISIT (OUTPATIENT)
Dept: ORTHOPEDIC SURGERY | Age: 54
End: 2024-09-09

## 2024-09-09 DIAGNOSIS — M25.532 LEFT WRIST PAIN: Primary | ICD-10-CM

## 2024-09-09 PROCEDURE — 99024 POSTOP FOLLOW-UP VISIT: CPT | Performed by: ORTHOPAEDIC SURGERY

## 2024-09-13 ENCOUNTER — HOSPITAL ENCOUNTER (OUTPATIENT)
Facility: CLINIC | Age: 54
Setting detail: THERAPIES SERIES
Discharge: HOME | End: 2024-09-13
Payer: COMMERCIAL

## 2024-09-13 PROCEDURE — 97140 MANUAL THERAPY 1/> REGIONS: CPT

## 2024-09-13 PROCEDURE — 97110 THERAPEUTIC EXERCISES: CPT

## 2024-09-23 ENCOUNTER — HOSPITAL ENCOUNTER (OUTPATIENT)
Facility: CLINIC | Age: 54
Setting detail: THERAPIES SERIES
Discharge: HOME | End: 2024-09-23
Payer: COMMERCIAL

## 2024-09-23 PROCEDURE — 97110 THERAPEUTIC EXERCISES: CPT

## 2024-09-23 PROCEDURE — 97140 MANUAL THERAPY 1/> REGIONS: CPT

## 2024-10-11 ENCOUNTER — HOSPITAL ENCOUNTER (OUTPATIENT)
Facility: CLINIC | Age: 54
Setting detail: THERAPIES SERIES
Discharge: HOME | End: 2024-10-11
Payer: COMMERCIAL

## 2024-10-11 PROCEDURE — 97110 THERAPEUTIC EXERCISES: CPT

## 2024-10-11 NOTE — FLOWSHEET NOTE
3-4/10 pain PROGRESSING  ROM: Pt. Will improve his L wrist ext. To 50 degrees for improved ability to push heavy objects at work. PROGRESSING  ROM: Pt. Will improve his L wrist flex. To 55 degrees for improved functional performance during ADLs/IADLs. PROGRESSING  ROM: Pt. Will improve his L FA supination to 65 degrees for improved ability to carry objects. PROGRESSING        Long Term Goals: (  10 treatments)  Strength (pounds): Pt will demonstrate increased L hand  strength to 60 lbs. 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 PROGRESSING  Function: Improved UE Functional Index Score to  70/80 for increased functional abilities. PROGRESSING  Patient to be independent with home exercise program as demonstrated by performance with correct form without cues. PROGRESSING        Patient Goals: Pt. Would like to decrease his pain and improve his functional performance in activities that require use of mayte. UEs.       Pt. Education:  [x] Yes  [] No  [] Reviewed Prior HEP/Ed  Method of Education: [x] Verbal  [] Demo  [] Written  Re: Plan of care, Proprioceptive Input  Comprehension of Education:  [x] Verbalizes understanding.  [x] Demonstrates understanding.  [] Needs review.  [] Demonstrates/verbalizes HEP/Ed previously given.      Plan: [x] Continue current frequency toward short and long term goals.  [x] Specific Instructions for subsequent treatments: See above   [] Other:       Treatment Charges: Mins Units (BWC) Time In/Out:   []  Modalities:       []  Ultrasound      [x]  Ther Exercise 56 4    []  Manual Therapy 6     []  Ther Activities      []  Orthotic fit/train      []  Orthotic recheck      []  Other      Total Billable time 62 4        Time In: 1:01pm Time Out: 2:03pm    Electronically signed by:  ROMY DEVINE/LEO

## 2024-10-21 ENCOUNTER — HOSPITAL ENCOUNTER (OUTPATIENT)
Facility: CLINIC | Age: 54
Setting detail: THERAPIES SERIES
Discharge: HOME | End: 2024-10-21
Payer: COMMERCIAL

## 2024-10-21 PROCEDURE — 97110 THERAPEUTIC EXERCISES: CPT

## 2024-10-21 NOTE — FLOWSHEET NOTE
to roll on counter while completing paperwork.       Specific Instructions for Next Treatment: Potential Discharge, Progress strength/WB as tolerated, PROM      HEP ([employer])  Access Code: [email or web address]  URL: [email or web address]  Date: 08/12/2024  Prepared by: Cole Mark     Exercises  - Seated Forearm Pronation and Supination AROM  - 1 x daily - 7 x weekly - 3 sets - 10 reps  - Wrist AROM Flexion Extension  - 1 x daily - 7 x weekly - 3 sets - 10 reps  - Wrist Tendon Gliding  - 1 x daily - 7 x weekly - 3 sets - 10 reps  - Seated Edema Reduction Massage  - 1 x daily - 7 x weekly - 3 sets - 10 reps  - Seated Wrist Flexion with Overpressure  - 1 x daily - 7 x weekly - 3 sets - 10 reps  - Wrist Prayer Stretch at Table  - 1 x daily - 7 x weekly - 3 sets - 10 reps     Assessment:     - Pt. Tolerated tx. Session well overall  - Pt. Progressed to completing 2x10x green flex bar pron/sup bends this day  - Pt. Tolerated prayer stretching well this day, however, he noted a burning sensation at the L volar/medial FA   - Pt. Reported MOD pain during green web wrist ext. Pressing with his outstretched LUE  - Pt. Reported 8/10 pain during WB through yellow putty with his outstretched LUE in standing  - Pt. Progressed to completing 10x  strengthening squeezes with yellow putty this day and reported MILD pain  - Pt. Progressed to completing 10x  strengthening squeezes with a blue DigiFlex this day  - Pt. Seems to demo a plateau in L wrist flex/ext active ROM      [x] Progressing toward goals.    [x] No change.  [] Other     [x] Patient would benefit from skilled occupational therapy services in order to: Improve  functional /grasp, I with ADLS, ROM, Strength, Activity tolerance, and Complaint of pain in order to improve functional use of UE in ADL performance, decrease pain in UE for safe completion of ADLs, and return to PLOF .     STG/LTG  Short Term Goals: (  5   treatments )  Pain: Pt. Will decrease his

## 2024-10-28 ENCOUNTER — HOSPITAL ENCOUNTER (OUTPATIENT)
Facility: CLINIC | Age: 54
Setting detail: THERAPIES SERIES
Discharge: HOME | End: 2024-10-28
Payer: COMMERCIAL

## 2024-10-28 PROCEDURE — 97110 THERAPEUTIC EXERCISES: CPT

## 2024-10-28 NOTE — THERAPY DISCHARGE
Cognitive Interventions, (first 15 min 99307, subsequent 15 min 19961)  [] Dry Needling, 1 or 2 muscles  69000    []  Massage   96843      [] Dry Needling, 3 or more muscles  20561   [x]  Cold/hotpack         Discharge Status:       [x] Pt to continue exercise/home instructions independently.    [x] Pt has completed prescribed number of treatment sessions.          Treatment Charges: Mins Units (BWC) Time In/Out:   []  Modalities:       []  Ultrasound      [x]  Ther Exercise 35 2    []  Manual Therapy      []  Ther Activities      []  Orthotic fit/train      []  Orthotic recheck      []  Other      Total Billable time 35 2        Time In: 3:04pm Time Out: 3:39pm    Electronically signed by:  ROMY DEVINE/LEO

## 2024-12-05 ENCOUNTER — OFFICE VISIT (OUTPATIENT)
Dept: ORTHOPEDIC SURGERY | Age: 54
End: 2024-12-05
Payer: COMMERCIAL

## 2024-12-05 VITALS — BODY MASS INDEX: 29.2 KG/M2 | RESPIRATION RATE: 14 BRPM | WEIGHT: 204 LBS | HEIGHT: 70 IN

## 2024-12-05 DIAGNOSIS — M25.461 SWELLING OF RIGHT KNEE JOINT: Primary | ICD-10-CM

## 2024-12-05 DIAGNOSIS — M25.561 RIGHT KNEE PAIN, UNSPECIFIED CHRONICITY: ICD-10-CM

## 2024-12-05 PROCEDURE — 1036F TOBACCO NON-USER: CPT

## 2024-12-05 PROCEDURE — 99214 OFFICE O/P EST MOD 30 MIN: CPT

## 2024-12-05 PROCEDURE — G8419 CALC BMI OUT NRM PARAM NOF/U: HCPCS

## 2024-12-05 PROCEDURE — G8428 CUR MEDS NOT DOCUMENT: HCPCS

## 2024-12-05 PROCEDURE — G8484 FLU IMMUNIZE NO ADMIN: HCPCS

## 2024-12-05 PROCEDURE — 3017F COLORECTAL CA SCREEN DOC REV: CPT

## 2024-12-05 RX ORDER — DICLOFENAC SODIUM 75 MG/1
75 TABLET, DELAYED RELEASE ORAL 2 TIMES DAILY WITH MEALS
Qty: 28 TABLET | Refills: 0 | Status: SHIPPED | OUTPATIENT
Start: 2024-12-05 | End: 2024-12-19

## 2024-12-09 NOTE — PROGRESS NOTES
Orthopedic Knee Encounter Note     Chief complaint: Right knee pain    HPI: Justin Kyle is a 54 y.o. male who presents for evaluation of right knee pain.  Patient states he has been dealing with pain in this knee for many years now and states between 5 to 15 years it has caused him issues.  He denies any true injury or trauma.  Denies any popping, clicking or catching into the knee.  States that the right knee has been an issue and that usually affects him on the medial aspect of his knee but recently has noticed sudden onset of swelling over the last week.  He states that he was prescribed a steroid taper dose and he did take this which did help with the swelling although states he had a weird reaction to it and caused a rash and some upper respiratory type symptoms so he did stop taking them.  States he did get an injection into the right deltoid region which did help with the swelling as well.  Patient denies any history with regards to the left knee but the left knee approximately 15 to 20 years ago did have a staph infection which required I&D and antibiotics.  He states that this does not present anything like the left 1 did and he states he is not having any trouble with ambulation that is just painful to walk at times and to be standing for long periods of time.  He denies any warmth or erythema.  Denies any subjective fevers.  Patient denies any history of gout but once again does have history of a septic negative left knee in remote past.    Patient is well-known to myself as well as Dr. Yañez as he is currently being treated for left distal radius fracture that had a closed reduction performed on it.  States that the wrist is doing better although does note a decrease in his function when compared to the contralateral side.  He states his pain is well-controlled however at this time.    Previous treatment:    NSAIDs: Steroid Dosepak although stopped early due to weird reaction.    Injections: Steroid

## 2024-12-12 ENCOUNTER — OFFICE VISIT (OUTPATIENT)
Dept: ORTHOPEDIC SURGERY | Age: 54
End: 2024-12-12
Payer: COMMERCIAL

## 2024-12-12 VITALS — BODY MASS INDEX: 29.2 KG/M2 | HEIGHT: 70 IN | RESPIRATION RATE: 14 BRPM | WEIGHT: 204 LBS

## 2024-12-12 DIAGNOSIS — S52.572D OTHER CLOSED INTRA-ARTICULAR FRACTURE OF DISTAL END OF LEFT RADIUS WITH ROUTINE HEALING, SUBSEQUENT ENCOUNTER: Primary | ICD-10-CM

## 2024-12-12 PROCEDURE — 99213 OFFICE O/P EST LOW 20 MIN: CPT

## 2024-12-12 PROCEDURE — 3017F COLORECTAL CA SCREEN DOC REV: CPT

## 2024-12-12 PROCEDURE — G8419 CALC BMI OUT NRM PARAM NOF/U: HCPCS

## 2024-12-12 PROCEDURE — 1036F TOBACCO NON-USER: CPT

## 2024-12-12 PROCEDURE — G8428 CUR MEDS NOT DOCUMENT: HCPCS

## 2024-12-12 PROCEDURE — G8484 FLU IMMUNIZE NO ADMIN: HCPCS

## 2024-12-12 NOTE — PROGRESS NOTES
slight pain he is experiencing will likely continue to dissipate.  We did discuss that he likely will see some decrease in his function with that wrist specifically with wrist extension given the fracture pattern and where it appears to be healing.  He states he is able to use the wrist for basically everything that he wants to do although does notice that is not the same as the other side.  He understands the reasoning for this given the fracture pattern and the alignment after healing.  Patient overall is satisfied with his function however at this time.  I did encourage him to keep up with his home exercises that he learned from therapy as I think he could still see some improvement in his function as well as his pain.  Patient is amenable to plan as outlined above and we will see the patient back in our clinic as needed but he was encouraged to contact our office with any questions or concern he may have.      Of note, I did recently see the patient for his right knee and states that the anti-inflammatory that I did start him on is making a little bit of a difference and has noticed a decrease in his swelling in the knee as well.  He is not as painful at nighttime and is able to ambulate with less pain.  I did encourage him to keep up with the anti-inflammatory and let me know if he would like to try a cortisone injection and/or aspiration of the knee should he continue to have symptoms.  Patient is also amenable to this plan and will contact the office with any questions or concerns he may have.      Total time spent of visit: 25 minutes

## 2024-12-19 ENCOUNTER — TELEPHONE (OUTPATIENT)
Dept: ORTHOPEDIC SURGERY | Age: 54
End: 2024-12-19

## 2024-12-19 DIAGNOSIS — M25.561 RIGHT KNEE PAIN, UNSPECIFIED CHRONICITY: Primary | ICD-10-CM

## 2024-12-19 DIAGNOSIS — M25.461 SWELLING OF RIGHT KNEE JOINT: ICD-10-CM

## 2024-12-19 NOTE — TELEPHONE ENCOUNTER
Right knee MRI ordered with instruction on locations to avoid for MRI. Attempted to contact patient to inform him of order. No answer and unable to leave voicemail due to full mailbox. Patient should plan on following up in office to review MRI results

## 2024-12-20 NOTE — TELEPHONE ENCOUNTER
[facility] [id number] - [place], [place] - [address] - P [phone] - F [phone]   [address], [place] [address]

## 2024-12-21 RX ORDER — DICLOFENAC SODIUM 75 MG/1
75 TABLET, DELAYED RELEASE ORAL 2 TIMES DAILY WITH MEALS
Qty: 28 TABLET | Refills: 0 | Status: SHIPPED | OUTPATIENT
Start: 2024-12-21 | End: 2025-01-04

## 2024-12-26 ENCOUNTER — HOSPITAL ENCOUNTER (OUTPATIENT)
Dept: MRI IMAGING | Age: 54
Discharge: HOME | End: 2024-12-28
Payer: COMMERCIAL

## 2024-12-26 DIAGNOSIS — M25.461 SWELLING OF RIGHT KNEE JOINT: ICD-10-CM

## 2024-12-26 DIAGNOSIS — M25.561 RIGHT KNEE PAIN, UNSPECIFIED CHRONICITY: ICD-10-CM

## 2024-12-26 PROCEDURE — 73721 MRI JNT OF LWR EXTRE W/O DYE: CPT

## 2025-01-02 ENCOUNTER — TELEPHONE (OUTPATIENT)
Dept: ORTHOPEDIC SURGERY | Age: 55
End: 2025-01-02

## 2025-01-02 NOTE — TELEPHONE ENCOUNTER
----- Message from Rosendo Sierra PA-C sent at 1/2/2025  3:36 PM EST -----  Pleas contact the patient and let him know that I did get a chance to review his MRI and would like him to follow up in my clinic to discuss the results and treatment options moving forward.  ----- Message -----  From: Milton Rothman Incoming Radiant Results From [employer] [facility]  Sent: 12/27/2024   9:24 AM EST  To: Rosendo Sierra PA-C

## 2025-01-07 DIAGNOSIS — S52.572D OTHER CLOSED INTRA-ARTICULAR FRACTURE OF DISTAL END OF LEFT RADIUS WITH ROUTINE HEALING, SUBSEQUENT ENCOUNTER: Primary | ICD-10-CM

## 2025-01-07 NOTE — TELEPHONE ENCOUNTER
[facility] [id number] - [place], [place] - [address] - P [phone] - F [phone] [[id number]]

## 2025-01-08 RX ORDER — DICLOFENAC SODIUM 75 MG/1
75 TABLET, DELAYED RELEASE ORAL 2 TIMES DAILY WITH MEALS
Qty: 28 TABLET | Refills: 0 | Status: CANCELLED | OUTPATIENT
Start: 2025-01-08 | End: 2025-01-22

## 2025-01-08 RX ORDER — DICLOFENAC SODIUM 75 MG/1
75 TABLET, DELAYED RELEASE ORAL 2 TIMES DAILY WITH MEALS
Qty: 28 TABLET | Refills: 0 | Status: SHIPPED | OUTPATIENT
Start: 2025-01-08

## 2025-01-08 NOTE — TELEPHONE ENCOUNTER
Patient called requesting a refill of Diclofenac be sent to his [facility] [address] In [place].

## 2025-01-09 ENCOUNTER — OFFICE VISIT (OUTPATIENT)
Dept: ORTHOPEDIC SURGERY | Age: 55
End: 2025-01-09
Payer: COMMERCIAL

## 2025-01-09 DIAGNOSIS — S83.241D TEAR OF MEDIAL MENISCUS OF RIGHT KNEE, CURRENT, UNSPECIFIED TEAR TYPE, SUBSEQUENT ENCOUNTER: Primary | ICD-10-CM

## 2025-01-09 DIAGNOSIS — D16.21 ENCHONDROMA OF RIGHT FEMUR: ICD-10-CM

## 2025-01-09 PROCEDURE — G8428 CUR MEDS NOT DOCUMENT: HCPCS

## 2025-01-09 PROCEDURE — 3017F COLORECTAL CA SCREEN DOC REV: CPT

## 2025-01-09 PROCEDURE — 1036F TOBACCO NON-USER: CPT

## 2025-01-09 PROCEDURE — 99214 OFFICE O/P EST MOD 30 MIN: CPT

## 2025-01-09 PROCEDURE — G8419 CALC BMI OUT NRM PARAM NOF/U: HCPCS

## 2025-01-09 PROCEDURE — 20610 DRAIN/INJ JOINT/BURSA W/O US: CPT

## 2025-01-09 RX ORDER — LIDOCAINE HYDROCHLORIDE 10 MG/ML
4 INJECTION, SOLUTION INFILTRATION; PERINEURAL ONCE
Status: COMPLETED | OUTPATIENT
Start: 2025-01-09 | End: 2025-01-09

## 2025-01-09 RX ORDER — TRIAMCINOLONE ACETONIDE 40 MG/ML
40 INJECTION, SUSPENSION INTRA-ARTICULAR; INTRAMUSCULAR ONCE
Status: COMPLETED | OUTPATIENT
Start: 2025-01-09 | End: 2025-01-09

## 2025-01-09 RX ADMIN — LIDOCAINE HYDROCHLORIDE 4 ML: 10 INJECTION, SOLUTION INFILTRATION; PERINEURAL at 11:52

## 2025-01-09 RX ADMIN — TRIAMCINOLONE ACETONIDE 40 MG: 40 INJECTION, SUSPENSION INTRA-ARTICULAR; INTRAMUSCULAR at 11:53

## 2025-01-09 NOTE — PROGRESS NOTES
HPI: Mr. Kyle is a 54-year-old male who presents to the clinic today for follow-up evaluation of his right knee pain.  When I last saw him at his office visit I did diagnose him with appear to be a possible medial meniscus tear and started him on an oral anti-inflammatory medication.  He states that the Voltaren tablets have been helpful although still having pain mostly over the medial aspect of his knee.  Because of this I did order an MRI of the right knee and he is here today to review the results of this MRI as well as treatment options available to him moving forward.  States that the swelling as well as the overall pain has improved but he does have good and bad days.  States that he is on his feet a lot for his job and even at home because he is a hard time sitting around and not being active.  Continues to endorse the pain mostly exclusively to the medial aspect of the knee.  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  Medial meniscus body tearing with some mild extrusion of the meniscus.  Lateral meniscus does appear intact without any tear.  There is a small joint effusion present.  ACL, PCL, LCL, MCL all appear intact without abnormality.  Patellar and quadriceps tendon appear intact.  No significant chondral wear regarding the lateral and patellofemoral compartments.  In addition, there does appear to be a small tumor like growth over the medial condyle of the femur read as a likely enchondroma by radiology.      Impression/plan: Mr. Kyle is a 54-year-old male who presents to the clinic today for follow-up evaluation of his right knee pain.  I did discuss with the patient that there does appear to be evidence of a medial meniscus degenerative tear as well as some cartilage wear of the medial compartment.  In addition I did discuss with him the enchondroma and ultimately offered him

## (undated) DEVICE — SYRINGE MED 10ML LUERLOCK TIP W/O SFTY DISP

## (undated) DEVICE — BLUNTFILL: Brand: MONOJECT

## (undated) DEVICE — BANDAGE COMPR W4INXL5YD WHT BGE POLY COT M E WRP WV HK AND

## (undated) DEVICE — IMMOBILIZER ORTH CONTACT CLOSURE STRP LG 18X9 IN PROCARE

## (undated) DEVICE — MERCY HEALTH ST CHARLES: Brand: MEDLINE INDUSTRIES, INC.

## (undated) DEVICE — BANDAGE CAST W3INXL3YD PLSTR OF PARIS FAST SET 5-8MIN LO

## (undated) DEVICE — SPONGE GZ W4XL4IN RAYON POLY CVR W/NONWOVEN FAB STRL 2/PK

## (undated) DEVICE — PADDING UNDERCAST W3INXL12FT RAYON POLY SYN NONADHESIVE

## (undated) DEVICE — BANDAGE COMPR W3INXL5YD WHT BGE POLY COT M E WRP WV HK AND

## (undated) DEVICE — HYPODERMIC SAFETY NEEDLE: Brand: MAGELLAN

## (undated) DEVICE — APPLICATOR MEDICATED 10.5 CC SOLUTION HI LT ORNG CHLORAPREP